# Patient Record
Sex: MALE | Race: WHITE | NOT HISPANIC OR LATINO | URBAN - METROPOLITAN AREA
[De-identification: names, ages, dates, MRNs, and addresses within clinical notes are randomized per-mention and may not be internally consistent; named-entity substitution may affect disease eponyms.]

---

## 2024-08-08 ENCOUNTER — OFFICE VISIT (OUTPATIENT)
Dept: URBAN - METROPOLITAN AREA CLINIC 33 | Facility: CLINIC | Age: 59
End: 2024-08-08
Payer: COMMERCIAL

## 2024-08-08 ENCOUNTER — DASHBOARD ENCOUNTERS (OUTPATIENT)
Age: 59
End: 2024-08-08

## 2024-08-08 VITALS
WEIGHT: 233.8 LBS | OXYGEN SATURATION: 98 % | SYSTOLIC BLOOD PRESSURE: 124 MMHG | DIASTOLIC BLOOD PRESSURE: 80 MMHG | HEART RATE: 68 BPM | BODY MASS INDEX: 33.47 KG/M2 | HEIGHT: 70 IN

## 2024-08-08 DIAGNOSIS — K76.0 FATTY LIVER: ICD-10-CM

## 2024-08-08 DIAGNOSIS — R74.8 ELEVATED LIVER ENZYMES: ICD-10-CM

## 2024-08-08 PROCEDURE — 99204 OFFICE O/P NEW MOD 45 MIN: CPT | Performed by: INTERNAL MEDICINE

## 2024-08-08 PROCEDURE — 99254 IP/OBS CNSLTJ NEW/EST MOD 60: CPT | Performed by: INTERNAL MEDICINE

## 2024-08-08 RX ORDER — EPLERENONE 25 MG/1
1 TABLET TABLET ORAL ONCE A DAY
Status: ACTIVE | COMMUNITY

## 2024-08-08 RX ORDER — AMLODIPINE BESYLATE 5 MG/1
1 TABLET TABLET ORAL ONCE A DAY
Status: ACTIVE | COMMUNITY

## 2024-08-08 RX ORDER — OLMESARTAN MEDOXOMIL 40 MG/1
1 TABLET TABLET, FILM COATED ORAL ONCE A DAY
Status: ACTIVE | COMMUNITY

## 2024-08-08 RX ORDER — TAMSULOSIN HYDROCHLORIDE 0.4 MG/1
1 CAPSULE CAPSULE ORAL ONCE A DAY
Status: ACTIVE | COMMUNITY

## 2024-08-08 RX ORDER — OMEPRAZOLE 40 MG/1
1 CAPSULE 30 MINUTES BEFORE MORNING MEAL CAPSULE, DELAYED RELEASE ORAL ONCE A DAY
Status: ACTIVE | COMMUNITY

## 2024-08-08 RX ORDER — MULTIVITAMIN
1 TABLET TABLET ORAL ONCE A DAY
Status: ACTIVE | COMMUNITY

## 2024-08-08 NOTE — HPI-FATTY LIVER
58 year old male patient presents today as a new patient for fatty liver. He admits a history of elevated liver enzymes. Patient's most recent labs were done on 07/03/2024 and 06/16/2024, and the results are noted below. Patient had a RUQ abdominal ultrasound on 07/17/2024 and the results are also noted below. He denies any jaundice, fatigue, dizziness, RUQ pain, bloating, easy bruising or chills. Patient admits alcohol use. He reports he consumes at least 1 bottle or a bottle in a half a night (whether its Vodka or wine)  Patient admits risk factors of alcohol use, but denies drug use, travel outside the [place], NSAIDs, protein supplements, tattoos, piercings,  service, blood transfusion, family history of liver disease or cancer, personal exposure to liver diseases, FDC, rehab. He admits/denies a family history of liver disease or cancer.   Lab rersults from 07/03/2024 were as follows :  BMP, all results were normal except for the following, Glucose was HIGH @104, Sodium was LOW @ 133. CMP, all results were normal excpet for the following, Glucose was HIGH @ 105, Creatinine was LOW @ 0.75, Sodium was LOW @ 131, AST was HIGH @ 56, ALT was HIGH @ 89. CBC/W/DIFF/PLT, all results were normal except for the following, MCV was HIGH @ 104, MCH was HIGH @ 34.4.   Lab results from 06/16/2024 were as follows :  GGT was HIGH @146. Albumin/Creatinine ratio, urine, all results were normal. Lipid panel, all results were normal except for the following, Triglycerides were HIGH @ 155.  RUQ abdominal ultrasound done on 07/17/2024 :  IMPRESSION : No acutesonographic abnormalities. Hepatomeagaly with hepatic steatosis.

## 2024-08-24 LAB
ACTIN (SMOOTH MUSCLE) ANTIBODY: 4
ALPHA-1-ANTITRYPSIN, SERUM: 134
ANA DIRECT: NEGATIVE
CERULOPLASMIN: 19.7
FERRITIN, SERUM: 650
HBSAG SCREEN: NEGATIVE
HEP A AB, IGM: NEGATIVE
HEP A AB, TOTAL: NEGATIVE
HEP B CORE AB, IGM: NEGATIVE
HEP B CORE AB, TOT: NEGATIVE
HEP C VIRUS AB: NON REACTIVE
HEPATITIS B SURF AB QUANT: <3.5
IMMUNOGLOBULIN A, QN, SERUM: 384
IMMUNOGLOBULIN E, TOTAL: 405
IMMUNOGLOBULIN G, QN, SERUM: 929
IMMUNOGLOBULIN M, QN, SERUM: 40
IRON BIND.CAP.(TIBC): 330
IRON SATURATION: 70
IRON: 231
LIVER-KIDNEY MICROSOMAL AB: 0.5
MITOCHONDRIAL (M2) ANTIBODY: <20
PHENOTYPE (PI): (no result)
UIBC: 99

## 2024-08-26 ENCOUNTER — LAB OUTSIDE AN ENCOUNTER (OUTPATIENT)
Dept: URBAN - METROPOLITAN AREA CLINIC 35 | Facility: CLINIC | Age: 59
End: 2024-08-26

## 2024-08-27 LAB — HEPATITIS C ANTIBODY: (no result)
